# Patient Record
Sex: FEMALE | Race: WHITE | URBAN - METROPOLITAN AREA
[De-identification: names, ages, dates, MRNs, and addresses within clinical notes are randomized per-mention and may not be internally consistent; named-entity substitution may affect disease eponyms.]

---

## 2017-05-26 ENCOUNTER — OFFICE VISIT (OUTPATIENT)
Dept: URGENT CARE | Facility: URGENT CARE | Age: 37
End: 2017-05-26
Payer: COMMERCIAL

## 2017-05-26 VITALS
SYSTOLIC BLOOD PRESSURE: 102 MMHG | HEART RATE: 64 BPM | OXYGEN SATURATION: 100 % | WEIGHT: 168 LBS | DIASTOLIC BLOOD PRESSURE: 72 MMHG | TEMPERATURE: 98.1 F

## 2017-05-26 DIAGNOSIS — L30.9 DERMATITIS: Primary | ICD-10-CM

## 2017-05-26 PROCEDURE — 99213 OFFICE O/P EST LOW 20 MIN: CPT | Performed by: PHYSICIAN ASSISTANT

## 2017-05-26 RX ORDER — CLOTRIMAZOLE AND BETAMETHASONE DIPROPIONATE 10; .64 MG/G; MG/G
CREAM TOPICAL 2 TIMES DAILY
Qty: 15 G | Refills: 1 | Status: SHIPPED | OUTPATIENT
Start: 2017-05-26

## 2017-05-26 NOTE — PROGRESS NOTES
SUBJECTIVE:  Susanne Garza is a 37 year old female who presents to the clinic today for a rash.  Onset of rash was 2 day(s) ago.   Rash is still present.  Location of the rash: left arm and left inner thigh.  Quality/symptoms of rash: raised rash, annular   Symptoms are mild and rash seems to be worsening.  Previous history of a similar rash? No  Recent exposure history: none  Recent new medications: none  Associated symptoms include: raised rash.    No past medical history on file.     ALLERGIES   No Known Allergies     Social History   Substance Use Topics     Smoking status: Former Smoker     Quit date: 5/26/2002     Smokeless tobacco: Never Used     Alcohol use Not on file       ROS:  CONSTITUTIONAL:NEGATIVE for fever, chills, change in weight  INTEGUMENTARY/SKIN: POSITIVE for rash on left arm and left inner thight  MUSCULOSKELETAL: NEGATIVE for significant arthralgias or myalgia  NEURO: NEGATIVE for weakness, dizziness or paresthesias    EXAM:   /72 (BP Location: Left arm, Patient Position: Chair, Cuff Size: Adult Regular)  Pulse 64  Temp 98.1  F (36.7  C) (Oral)  Wt 168 lb (76.2 kg)  SpO2 100%  GENERAL: alert, no acute distress.  SKIN: Rash description:    Distribution: localized  Location: 2 spots on left upper arm and 1 annular rash on left inner thigh    Color: skin color,  Lesion type: maculopapular, isolated with inflammation  Extremities: no peripheral edema or tenderness, peripheral pulses normal  MS:  extremities normal- no gross deformities noted, no erythema, FROM noted in all extremities  NEURO: Normal strength and tone, sensory exam grossly normal,  normal speech and mentation    ASSESSMENT/PLAN:      ICD-10-CM    1. Dermatitis L30.9 clotrimazole-betamethasone (LOTRISONE) cream       1) See today's orders.  2) Follow-up with primary clinic if not improving

## 2017-05-26 NOTE — MR AVS SNAPSHOT
"              After Visit Summary   2017    Susanne Garza    MRN: [id number]           Patient Information     Date Of Birth          1980        Visit Information        Provider Department      2017 12:05 PM Don Patricio PA-C [facility]        Today's Diagnoses     Dermatitis    -  1       Follow-ups after your visit        Who to contact     If you have questions or need follow up information about today's clinic visit or your schedule please contact [facility] directly at [phone].  Normal or non-critical lab and imaging results will be communicated to you by Social[employer], letter or phone within 4 business days after the clinic has received the results. If you do not hear from us within 7 days, please contact the clinic through Socialarehart or phone. If you have a critical or abnormal lab result, we will notify you by phone as soon as possible.  Submit refill requests through [employer] or call your pharmacy and they will forward the refill request to us. Please allow 3 business days for your refill to be completed.          Additional Information About Your Visit        MyChart Information     [employer] lets you send messages to your doctor, view your test results, renew your prescriptions, schedule appointments and more. To sign up, go to [email or web address] . Click on \"Log in\" on the left side of the screen, which will take you to the Welcome page. Then click on \"Sign up Now\" on the right side of the page.     You will be asked to enter the access code listed below, as well as some personal information. Please follow the directions to create your username and password.     Your access code is: [email or web address]  Expires: 2017 12:45 PM     Your access code will  in 90 days. If you need help or a new code, please call your [place] clinic or [phone].        Care EveryWhere ID     This is your Care EveryWhere ID. This could " be used by other organizations to access your [facility] medical records  [id number]        Your Vitals Were     Pulse Temperature Pulse Oximetry             64 98.1  F (36.7  C) (Oral) 100%          Blood Pressure from Last 3 Encounters:   05/26/17 102/72    Weight from Last 3 Encounters:   05/26/17 168 lb (76.2 kg)              Today, you had the following     No orders found for display         Today's Medication Changes          These changes are accurate as of: 5/26/17 12:45 PM.  If you have any questions, ask your nurse or doctor.               Start taking these medicines.        Dose/Directions    clotrimazole-betamethasone cream   Commonly known as:  LOTRISONE   Used for:  Dermatitis   Started by:  Don Patricio PA-C        Apply topically 2 times daily   Quantity:  15 g   Refills:  1            Where to get your medicines      These medications were sent to [id number] [address] [place] [address] [address] AT [place] [address]  [address] [facility] [address]    Hours:  24-hours Phone:  [phone]     clotrimazole-betamethasone cream                Primary Care Provider    None Specified       No primary provider on file.        Thank you!     Thank you for choosing [facility]  for your care. Our goal is always to provide you with excellent care. Hearing back from our patients is one way we can continue to improve our services. Please take a few minutes to complete the written survey that you may receive in the mail after your visit with us. Thank you!             Your Updated Medication List - Protect others around you: Learn how to safely use, store and throw away your medicines at www.disposemymeds.org.          This list is accurate as of: 5/26/17 12:45 PM.  Always use your most recent med list.                   Brand Name Dispense Instructions for use    clotrimazole-betamethasone cream    LOTRISONE    15 g    Apply topically 2 times  daily

## 2017-05-26 NOTE — NURSING NOTE
Chief Complaint   Patient presents with     Rash     rash noted on lt thigh yesterday,today has 2 spots of rt lower arm       Initial /72 (BP Location: Left arm, Patient Position: Chair, Cuff Size: Adult Regular)  Pulse 64  Temp 98.1  F (36.7  C) (Oral)  Wt 168 lb (76.2 kg)  SpO2 100% There is no height or weight on file to calculate BMI.  Medication Reconciliation: complete   Becky WHITE